# Patient Record
Sex: FEMALE | Race: BLACK OR AFRICAN AMERICAN | NOT HISPANIC OR LATINO | URBAN - METROPOLITAN AREA
[De-identification: names, ages, dates, MRNs, and addresses within clinical notes are randomized per-mention and may not be internally consistent; named-entity substitution may affect disease eponyms.]

---

## 2017-09-27 ENCOUNTER — EMERGENCY (EMERGENCY)
Facility: HOSPITAL | Age: 22
LOS: 1 days | Discharge: HOME | End: 2017-09-27
Attending: EMERGENCY MEDICINE
Payer: COMMERCIAL

## 2017-09-27 VITALS
OXYGEN SATURATION: 100 % | DIASTOLIC BLOOD PRESSURE: 91 MMHG | HEART RATE: 68 BPM | RESPIRATION RATE: 18 BRPM | TEMPERATURE: 98 F | SYSTOLIC BLOOD PRESSURE: 123 MMHG

## 2017-09-27 VITALS — HEIGHT: 65 IN | WEIGHT: 113.1 LBS

## 2017-09-27 PROCEDURE — 99282 EMERGENCY DEPT VISIT SF MDM: CPT

## 2017-09-27 PROCEDURE — 99283 EMERGENCY DEPT VISIT LOW MDM: CPT

## 2017-09-27 NOTE — ED ADULT TRIAGE NOTE - CHIEF COMPLAINT QUOTE
Patient arrived ambulatory to ED, employee on duty, awake, alert, and oriented times 3, breathing unlabored.   Patient had root canal done monday and has been taking motrin and PCN as prescibed and since then has been N/V.

## 2017-09-27 NOTE — ED STATDOCS - OBJECTIVE STATEMENT
22 y/o F pt with no pertinent past medical hx, presents to ED c/o nausea. 22 y/o F pt with no pertinent past medical hx, presents to ED c/o vomiting (non bloody, five times today) earlier today s/p taking penicillin on an empty stomach. Able to tolerate Gatorade after work. Pt was shaking when vomiting. Additionally pt had a root canal and was given Penicillin and has been feeling nauseas since she has been taking the medication. Pt also takes Ibuprofen. Additionally pt will f/up with her dentist in two days. She is also birth control and states her period has been irregular.  Denies N/V/D, fever, chills, SOB, CP, abdominal pain, dysuria and hematuria. No further complaints at this time. 20 y/o F pt with no pertinent past medical hx, presents to ED c/o vomiting (non bloody, five times today) earlier today s/p taking penicillin on an empty stomach. Able to tolerate Gatorade after work.  pt had a root canal and was given Penicillin on monday tolerated pills well on mon and tuesday but today didn't eat and took the abx Pt also takes Ibuprofen.  Denies N/D, fever, chills, SOB, CP, abdominal pain, dysuria and hematuria. No further complaints at this time.

## 2017-09-27 NOTE — ED STATDOCS - CARE PLAN
Principal Discharge DX:	Vomiting without nausea, intractability of vomiting not specified, unspecified vomiting type

## 2019-07-04 ENCOUNTER — EMERGENCY (EMERGENCY)
Facility: HOSPITAL | Age: 24
LOS: 1 days | Discharge: HOME | End: 2019-07-04
Attending: STUDENT IN AN ORGANIZED HEALTH CARE EDUCATION/TRAINING PROGRAM
Payer: COMMERCIAL

## 2019-07-04 VITALS
RESPIRATION RATE: 18 BRPM | DIASTOLIC BLOOD PRESSURE: 66 MMHG | SYSTOLIC BLOOD PRESSURE: 104 MMHG | TEMPERATURE: 99 F | OXYGEN SATURATION: 100 % | HEART RATE: 69 BPM | WEIGHT: 125 LBS | HEIGHT: 65 IN

## 2019-07-04 PROCEDURE — 99283 EMERGENCY DEPT VISIT LOW MDM: CPT

## 2019-07-04 PROCEDURE — 99283 EMERGENCY DEPT VISIT LOW MDM: CPT | Mod: 25

## 2019-07-04 PROCEDURE — 96372 THER/PROPH/DIAG INJ SC/IM: CPT

## 2019-07-04 PROCEDURE — 99053 MED SERV 10PM-8AM 24 HR FAC: CPT

## 2019-07-04 RX ORDER — KETOROLAC TROMETHAMINE 30 MG/ML
15 SYRINGE (ML) INJECTION ONCE
Refills: 0 | Status: DISCONTINUED | OUTPATIENT
Start: 2019-07-04 | End: 2019-07-04

## 2019-07-04 RX ORDER — IBUPROFEN 200 MG
1 TABLET ORAL
Qty: 21 | Refills: 0
Start: 2019-07-04 | End: 2019-07-10

## 2019-07-04 RX ORDER — LIDOCAINE 4 G/100G
1 CREAM TOPICAL ONCE
Refills: 0 | Status: COMPLETED | OUTPATIENT
Start: 2019-07-04 | End: 2019-07-04

## 2019-07-04 RX ORDER — METHOCARBAMOL 500 MG/1
2 TABLET, FILM COATED ORAL
Qty: 30 | Refills: 0
Start: 2019-07-04 | End: 2019-07-08

## 2019-07-04 RX ADMIN — Medication 15 MILLIGRAM(S): at 04:49

## 2019-07-04 RX ADMIN — LIDOCAINE 1 PATCH: 4 CREAM TOPICAL at 04:48

## 2019-07-04 NOTE — ED ADULT NURSE NOTE - OBJECTIVE STATEMENT
Pt A&Ox4 c/o back pain after falling while changing a curtian at work at this time. Pt resting comfortably, VSS, no signs of distress at this time

## 2019-07-04 NOTE — ED ADULT TRIAGE NOTE - CHIEF COMPLAINT QUOTE
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

## 2019-07-04 NOTE — ED PROVIDER NOTE - PHYSICAL EXAMINATION
Gen: Well nourished/developed, in no acute distress, non-toxic appearing   Skin: Warm, dry, color appropriate for race  HEENT: NC/AT. Moist mucous membranes. No scleral icterus. EOMI.  Neck:. Soft and supple. Full ROM without pain.  Cardiac: Regular rate and regular rhythm. Peripheral pulses 2+ and symmetric. No LE edema.  Resp: Speaking in full sentences. No evidence of respiratory distress. No wheezes, rales or rhonchi.  Abd: Soft, non-tender, non-distended. Normal bowel sounds in all 4 quadrants. No guarding or rebound.  Back: Spine midline and non-tender. no step offs. + Paraspinal ttp lumbosacral region. Pt ambulatory in ED with normal gait.   Neuro: Awake, alert & oriented x 3. Moves all extremities symmetrically. Sensorimotor intact x 4

## 2019-07-04 NOTE — ED PROVIDER NOTE - ATTENDING CONTRIBUTION TO CARE
I personally saw the patient with the PA, and completed the key components of the history and physical exam. I then discussed the management plan with the PA.  24yo female with no pmh presents with back pain s/p fall. Pt putting curtain up and fell off a chair onto her buttocks and lower back, no crack heard, able ambulate.  States not pregnant.    Pt denies bladder/bowel incontinence. saddle anesthesia, neuro deficits. No midline spine tenderness, no step offs. + Paraspinal ttp. Likely msk pain, analgesia, reassess

## 2019-07-04 NOTE — ED PROVIDER NOTE - NS ED ROS FT
Gen: denies weakness, malaise/fatigue, fever, chills  Skin: denies rashes, hives, bruising.  Neck: denies neck pain, enlarged/tender lymph nodes  Respiratory: denies cough, dyspnea, PARKER, SOB, wheezing  Cardiovascular: denies chest pain, palpitations, diaphoresis, edema  GI: denies abdominal pain, nausea/vomiting, diarrhea/constipation  : denies dysuria, hematuria, frequency, urgency, hesitancy, incontinence of bowel/bladder  MSK: +Low back pain. denies neck pain, limitation on movement, weakness, joint swelling/redness/warmth  Neuro: denies LOC, convulsions/seizures, syncope, headache, dizziness, vertigo, numbness/tingling

## 2019-07-04 NOTE — ED PROVIDER NOTE - OBJECTIVE STATEMENT
24yo female, [employer] employee with no pmhx presents with back pain s/p fall. Pt putting curtain up and fell off a chair onto her buttocks and lower back, no crack heard, able to ambulate. Did not hit head, no LOC. States not pregnant. No pain meds taken. No further complaints. Denies bowel/bladder incontinence, weakness, dizziness, head injury, LOC, neck pain.

## 2019-07-04 NOTE — ED PROVIDER NOTE - CLINICAL SUMMARY MEDICAL DECISION MAKING FREE TEXT BOX
Pt with low back pain s/p fall. No midline spinal tenderness. Will provide analgesia with toradol, lidoderm patch and f/u PMD. Rx provided for ibuprofen, robaxin, pt educated on ice to area for first 24 hours followed by heat.

## 2019-12-15 ENCOUNTER — EMERGENCY (EMERGENCY)
Facility: HOSPITAL | Age: 24
LOS: 1 days | Discharge: HOME | End: 2019-12-15
Attending: EMERGENCY MEDICINE
Payer: COMMERCIAL

## 2019-12-15 VITALS
OXYGEN SATURATION: 100 % | WEIGHT: 145.06 LBS | HEART RATE: 83 BPM | DIASTOLIC BLOOD PRESSURE: 75 MMHG | HEIGHT: 65 IN | SYSTOLIC BLOOD PRESSURE: 102 MMHG | TEMPERATURE: 98 F | RESPIRATION RATE: 18 BRPM

## 2019-12-15 PROBLEM — Z78.9 OTHER SPECIFIED HEALTH STATUS: Chronic | Status: ACTIVE | Noted: 2019-07-04

## 2019-12-15 PROCEDURE — 99283 EMERGENCY DEPT VISIT LOW MDM: CPT

## 2019-12-15 RX ORDER — PSEUDOEPHEDRINE HCL 30 MG
30 TABLET ORAL ONCE
Refills: 0 | Status: COMPLETED | OUTPATIENT
Start: 2019-12-15 | End: 2019-12-15

## 2019-12-15 RX ORDER — PSEUDOEPHEDRINE HCL 30 MG
1 TABLET ORAL
Qty: 14 | Refills: 0
Start: 2019-12-15 | End: 2019-12-21

## 2019-12-15 RX ADMIN — Medication 30 MILLIGRAM(S): at 17:36

## 2019-12-15 RX ADMIN — Medication 1 TABLET(S): at 17:36

## 2019-12-15 NOTE — ED PROVIDER NOTE - CLINICAL SUMMARY MEDICAL DECISION MAKING FREE TEXT BOX
23 y/o F pt with no pmhx here with chronic sinusitis x3 weeks. Informed pt due to chronic nature of dz and need for ENT f/u. Will give rx for sudafed and instructions for ENT f/u

## 2019-12-15 NOTE — ED PROVIDER NOTE - CARE PROVIDER_API CALL
Timur Fitzgerald)  Otolaryngology  [address] [place] [address], [place] [address]  Phone: [phone]  Fax: [phone]  Follow Up Time: 1-3 Days

## 2019-12-15 NOTE — ED PROVIDER NOTE - PATIENT PORTAL LINK FT
You can access the FollowMyHealth Patient Portal offered by [facility] by registering at the following website: [email or web address]. By joining [employer]’s FollowMyHealth portal, you will also be able to view your health information using other applications (apps) compatible with our system.

## 2019-12-15 NOTE — ED ADULT TRIAGE NOTE - CHIEF COMPLAINT QUOTE
Patient states that she has been having a productive cough, running nose, headache and congestion x 3 weeks. Patient states that she has been taking mucinex, ibuprofen, nyquil and tylenol at home with no relief. Patient denies any fevers at home.

## 2019-12-15 NOTE — ED PROVIDER NOTE - OBJECTIVE STATEMENT
23 y/o F pt with no pmhx who presents today with cc of sinus congestion for x3 weeks. Pt reports that she has been experiencing sinus pressure, congestion, runny nose, and sore throat. She has tried mucinex, motrin, tylenol, and nasal decongestants with no relief. Pt denies any chest pain, dyspnea, n/v/d, abdominal pain, dysuria, neck pain, back pain, weakness, numbness, tingling, or other complaint.

## 2019-12-15 NOTE — ED PROVIDER NOTE - PHYSICAL EXAMINATION
Gen: no acute distress  Head: normocephalic, atraumatic  ENT: rhinorrhea, post-nasal drip, no posterior pharynx eryghema or exudate, frontal and paranasal sinus tenderness to palpation, TM's clear bilaterally, no mastoid tenderness  Lung: CTAB, no respiratory distress, no wheezing, rales, rhonchi  CV: normal s1/s2, rrr, no murmurs, Normal perfusion  Abd: soft, non-tender, non-distended  MSK: No edema, no visible deformities, full range of motion in all 4 extremities  Neuro: No focal neurologic deficits  Skin: No rash   Psych: normal affect

## 2019-12-15 NOTE — ED PROVIDER NOTE - ATTENDING CONTRIBUTION TO CARE
25yo female with nasal congestion, sore throat, myalgias x 3 weeks since getting flu vaccine. Patient states that since flu vaccine she noticed symptoms started, were initially improving but now feel worse. No fevers. States that head pain located over sinuses. No OCP use.       PE:  Gen: NAD  Head: NCAT  HEENT: +TTP over frontal and maxillary sinuses b/l, Oral mucosa moist, normal conjunctiva, oropharynx clear without exudate/erythema  CV: RRR no murmurs, normal perfusion  Resp: CTA b/l, no wheezing, rales, rhonchi, no respiratory distress  GI: Abd Soft NTND  Neuro: No focal neuro deficits  MSK: FROM all 4 extremities, no deformity  Skin: No rash, no bruising  Psych: Normal affect    MDM: 25yo F with sinusitis- well appearing, NAD, will dc home with antibiotics, decongestant, PMD f/u. Nidhi Chris DO         I performed a history and physical exam of the patient and discussed their management with the resident. I reviewed the resident's note and agree with the documented findings and plan of care. My medical decision making and observations are found above.

## 2020-04-26 ENCOUNTER — MESSAGE (OUTPATIENT)
Age: 25
End: 2020-04-26

## 2021-02-22 PROBLEM — Z00.00 ENCOUNTER FOR PREVENTIVE HEALTH EXAMINATION: Status: ACTIVE | Noted: 2021-02-22

## 2021-05-25 ENCOUNTER — APPOINTMENT (OUTPATIENT)
Dept: SPINE | Facility: CLINIC | Age: 26
End: 2021-05-25

## 2021-05-25 NOTE — END OF VISIT
[FreeTextEntry3] : I, Dr. Peyton Esparza, evaluated the patient with the nurse practitioner Amauri Miranda and established the plan of care. I personally discuss this patient with the nurse practitioner at the time of the visit. I agree with the assessment and plan as written, unless noted below.\par \par

## 2021-10-04 NOTE — ED ADULT NURSE NOTE - CHPI ED NUR SYMPTOMS NEG
DISCHARGE no difficulty bearing weight/no neck tenderness/no numbness/no fatigue/no bowel dysfunction/no tingling/no motor function loss/no anorexia/no bladder dysfunction/no constipation

## 2025-01-13 NOTE — ED ADULT TRIAGE NOTE - HEART RATE (BEATS/MIN)
68 The patient location is: [place]   The chief complaint leading to consultation is: UTI follow up      Visit type: audiovisual     Notes:     Subjective:     HPI: 32 y.o. male with pertinent PMHx of post traumatic quadriplegia, HTN, urinary incontinence leading to chronic suprapubic catheterization and recurrent UTI, and constipation who was evaluated by Infectious Diseases on 7/21/23 after clean catch U/A showed positive nitrites and 12 WBC. Urine culture grew pan R Morganella morganii and  Pseudomonas. Plan was for 2 weeks of targeted IV antibiotics. PICC placed on 7/28. Plan was then to give dose of tobramycin and a course of pip/tazo via PICC line followed by repeat U/A. 8/24, patient states he gets UTI frequently. Changes catheter every 2 weeks at home. Usually gets chills, sweats, abdominal pain, urinary difficulty, and foul odor with UTI. Had these symptoms when July UTI was diagnosed. Currently having abdominal pain and myalgias. Unsure if this is due to unrelated ailment or another UTI. Tolerated pip/tazo other than intermittent loose stools. Has provided new urine sample today. Will see if any pathogens grow and discuss whether further antibiotics are warranted. Patient with provide update if worsening symptoms occur. Scheduled for home catheter change next week. Agreeable to PICC removal today.     Last ID note 11/2: was recently given Fosfomycin for last urine culture.  He is colonized with bacteria and not all the cultures mean a true infection .  WE went through this issue again .  Continue Lithostat /Azo dye as needed.     12/6/23: Today patient reports constipation and pain on both sides of abdomen for a few weeks. Has also struggled to urinate through Crenshaw. Also pain below neck to feet within past week. Mainly a nerve pain especially in right arm. Left side feels more like gas. Also with trapped gas due to wheelchair.  U/A from urology on 12/5 with 20 WBC. Culture in process. One week ago  urine was sterile. CT a/p has been scheduled for 12/18. HH irrigated Crenshaw and it flowed well yesterday but today the physical flow still bad. Only 600 cc went into bag. Day before had 1500 cc. Has not had any fever. No nausea or vomiting. No shortness of breath or cough. Has appetite but unable to eat or drink much. Given how many symptoms patient is experiencing, advised him to go to ER for imaging and blood work. May also be able to provide pain management and a bowel regimen for his constipation. Will follow urine culture result.      2/28/24: Current UTI symptoms- abdominal pain and foul odor. Crenshaw last changed 1 week ago. Changed every 2-3 weeks. [facility] doing the exchanges. No current fever. Urine appears darker but not abnormal. Discussed treating based on last urine culture using IV tobramycin. Will reach out to [facility]. Will also discuss bowel regimen with PCP. Has been frequently constipated which is huge risk factor. Will ask HH to stick to biweekly exchange of Crenshaw.      3/14: Received 3 doses of tobramycin. Last urine culture from 3/5 no growth. Feels good today. Still battling constipation. On new bowel regimen. Will adjust with help of PCP. Sees GI next month.      6/14: Here for follow up. Per Kash was given large dose of amikacin 2 days ago. Currently no UTI symptoms. Still dealing with constipation but more BM than before. Placed on stronger med than Linzess. Crenshaw changed every 2 weeks. No fever but occasional chills. Continues to have generalized pain from chest to pelvis. Asking for MRI as CT have been negative. Will place order today.      7/3: Patient with another pyuria. Is concerned about recurrent infection. Will use meropenem as aggressive treatment. Then may try an oral suppression regimen. Patient in agreement. PICC ordered and [facility] updated.      10/17: Patient took dose of fosfomycin but concerned it would not be sufficient. Have set up for tobramycin through [facility]. Will  "plan for 5 doses. Has discussed a few options with urology including surgical reconstruction of ureters and gentamicin irrigation. He is on the fence about surgical approach. Will reach out to urology for further clarification. Do agree with irrigations. Can also discuss antibiotic suppression. Patient voiced understanding. Denies new symptoms currently.      10/31: Last /70. Has started chronic prophylaxis with low dose Omnicef and tolerating. Has not yet received bladder irrigation with gentamicin, only sterile water. Will see if  is able to obtain the gentamicin. Overall feels well. Says "nothing has gotten worse". Has an appetite but has lost weight. New wheelchair may help with bowel movements.      12/3: Going to see GI soon about irregular bowels. Had urinary incontinence past 2 nights. Typically empties bag before bed but when he woke up there was only 200 cc in bag but floor was covered in urine. Changed catheter and same thing occurred the next day. Will discuss with urology. Last U/A sterile. Will plan to give holiday from gentamicin flushes after this month.      12/19: Asked for sooner follow up today. Still performing bladder irrigation. Ongoing since October. Explained that any bacteria present is urine is likely colonization at this point. Bladder has been sterilized. Needs ongoing bowel care. Can trial Metamucil. Will give break from irrigation until next year. To continue with daily Omnicef until April. Voiced understanding.      1/13: Will stop cefdinir. ESBL on last culture. Treated with Macrobid but stopped. Was having nausea, chills, and cold sweats. Unable to vomit since paralysis. Unsure if he had fever. Notes low urine output in Crenshaw bag vs how much fluid he takes in. On average output 600 cc. Requesting IV abx via [facility]. Will set up. However, explained that resistance is a huge barrier to eradication of bacteria. Will likely remained colonized lifelong due to need for catheter. " Patient mentioned surgical rerouting of bladder which he is not interested in at this time. Also discussed risk of frequent CT scanning due to radiation. Discussed last results showed stool in colon. No stones/blockage.       Urine culture [[id number]] (Abnormal)  Collected: 01/04/25 1649   Order Status: Completed Specimen: Urine Updated: 01/07/25 1224    Urine Culture, Routine ESCHERICHIA COLI ESBL  >100,000 cfu/ml  No other significant isolate   Abnormal    Narrative:     Specimen Source->Urine   Susceptibility     Escherichia coli ESBL     CULTURE, URINE     Amp/Sulbactam >16/8 mcg/mL Resistant     Ampicillin >16 mcg/mL Resistant     Cefepime >16 mcg/mL Resistant     Ceftriaxone >2 mcg/mL Resistant     Ciprofloxacin >2 mcg/mL Resistant     Ertapenem <=0.5 mcg/mL Sensitive     Gentamicin <=2 mcg/mL Sensitive     Levofloxacin >4 mcg/mL Resistant     Meropenem <=1 mcg/mL Sensitive     Nitrofurantoin <=32 mcg/mL Sensitive     Piperacillin/Tazo <=8 mcg/mL Resistant     Tobramycin <=2 mcg/mL Sensitive     Trimeth/Sulfa >2/38 mcg/mL Resistant               Linear View         CULTURE, URINE [[id number]]    Order Status: Sent Specimen: Urine, Clean Catch    CULTURE, URINE [[id number]] Collected: 01/01/25 1600   Order Status: Completed Specimen: Urine Updated: 01/03/25 1745    Urine Culture, Routine Multiple organisms isolated. None in predominance.  Repeat if     clinically necessary.   Narrative:     Send normal result to authorizing provider's In Basket if  patient is active on MyChart:->Yes   CULTURE, URINE [[id number]]    Order Status: No result Specimen: Urine, Catheterized    Clostridium difficile EIA [[id number]] Collected: 10/24/24 1324   Order Status: Completed Specimen: Stool Updated: 10/25/24 0615    C. diff Antigen Negative    C difficile Toxins A+B, EIA Negative    Comment: Testing not recommended for children <24 months old.      Narrative:     Send normal result to authorizing provider's In Basket  if  patient is active on MyChart:->Yes     EXAMINATION:  CT ABDOMEN PELVIS WITH IV CONTRAST     CLINICAL HISTORY:  Bowel obstruction suspected;     TECHNIQUE:  Low dose axial images, sagittal and coronal reformations were obtained from the lung bases to the pubic symphysis following the IV administration of 100 mL of Omnipaque 350.     COMPARISON:  None     FINDINGS:  Heart: Normal size as far as seen. No effusion as far as seen.     Lung Bases: Granuloma right lung base.     Liver: Normal size and attenuation. No focal lesions.     Gallbladder: The gallbladder is not identified.     Bile Ducts: No dilatation.     Pancreas: No mass. No peripancreatic fat stranding.     Spleen: Normal.     Adrenals: Normal.     Kidneys/Ureters: Normal enhancement.  No mass or  hydroureteronephrosis.     Bladder: Suprapubic catheter.     Reproductive organs: Normal.     GI Tract/Mesentery: No evidence of bowel obstruction or inflammation.  No evidence of acute appendicitis.  Some fluid and stool in the colon.  Correlate to history of diarrhea.     Peritoneal Space: No ascites or free air.     Retroperitoneum: No significant adenopathy.     Abdominal wall: Small fat containing umbilical hernia..     Vasculature: No aneurysm.     Bones: No acute fracture. No suspicious lytic or sclerotic lesions.     Impression:     Fluid and stool within the colon may relate to diarrheal state.  Small fat containing umbilical hernia.  Suprapubic bladder catheter.     All CT scans at this facility use dose modulation, iterative reconstruction, and/or weight based dosing when appropriate to reduce radiation dose to as low as reasonably achievable.        Electronically signed by:Leland Simon  Date:                                            01/04/2025  Time:                                           19:20        Exam Ended: 01/04/25 18:45 CST Last Resulted: 01/04/25 19:20 CST       Review of Systems:   Negative unless otherwise noted positive-  Gen-  Weakness/ Fatigue  Neuro- Confusion  CV- chest pain   Resp: Cough/ SOB  GI- Nausea/Vomiting; +inconsistent stool texture, volume differs each day   - Unclear symptoms at this time; poor urine output   Extrem- Pain/Swelling     Objective:     Physical Exam:  General- Patient alert and oriented x3 in NAD  HEENT- PERRLA, EOMI, OP clear  Resp- No increased WOB noted. Not using accessory muscles.  Extrem- No cyanosis, clubbing, edema.   Skin-  No Jaundice. No visible skin lesions.        Plan:  Complicated UTI (urinary tract infection)  --Continue biweekly Crenshaw exchange   --Stop cefdinir   --Explained that it is challenging to distinguish between colonization and infection  --Having catheter poses risk of bacteria never being eradicated due to biofilm formation   --Risk of treatment currently outweighs benefit; patient still wishes to proceed with IV abx  --Needs bowel regimen to prevent constipation which is huge risk factor for recurrent UTI  --Patient does not wish to resume bladder irrigation   --Follow up with me virtually in 3 months     Outpatient Antibiotic Therapy Plan:    1) Infection: Complicated UTI; ESBL E coli    2) Antibiotics:    Intravenous antibiotics:  IV tobramycin 5 mg/kg daily x 5 doses     Suprapubic catheter  --Recommend changing at least every 2 weeks to prevent recurrent UTI   --Follow up with urology      Hypertension  Continue current medications. Follow with PCP.         Face to Face time with patient: 12 minutes   30 minutes of total time spent on the encounter, which includes face to face time and non-face to face time preparing to see the patient (eg, review of tests), Obtaining and/or reviewing separately obtained history, Documenting clinical information in the electronic or other health record, Independently interpreting results (not separately reported) and communicating results to the patient/family/caregiver, or Care coordination (not separately reported).      Each patient to  whom he or she provides medical services by telemedicine is: (1) informed of the relationship between the physician and patient and the respective role of any other health care provider with respect to management of the patient; and (2) notified that he or she may decline to receive medical services by telemedicine and may withdraw from such care at any time.